# Patient Record
Sex: MALE | Race: OTHER
[De-identification: names, ages, dates, MRNs, and addresses within clinical notes are randomized per-mention and may not be internally consistent; named-entity substitution may affect disease eponyms.]

---

## 2020-02-20 ENCOUNTER — TRANSCRIPTION ENCOUNTER (OUTPATIENT)
Age: 35
End: 2020-02-20

## 2021-12-07 ENCOUNTER — TRANSCRIPTION ENCOUNTER (OUTPATIENT)
Age: 36
End: 2021-12-07

## 2021-12-15 ENCOUNTER — APPOINTMENT (OUTPATIENT)
Age: 36
End: 2021-12-15

## 2022-08-15 ENCOUNTER — APPOINTMENT (OUTPATIENT)
Dept: AFTER HOURS CARE | Facility: EMERGENCY ROOM | Age: 37
End: 2022-08-15

## 2022-08-15 DIAGNOSIS — U07.1 COVID-19: ICD-10-CM

## 2022-08-15 PROCEDURE — 99204 OFFICE O/P NEW MOD 45 MIN: CPT | Mod: 95

## 2022-08-17 PROBLEM — U07.1 COVID-19 VIRUS INFECTION: Status: ACTIVE | Noted: 2022-08-17

## 2022-08-17 PROBLEM — Z00.00 ENCOUNTER FOR PREVENTIVE HEALTH EXAMINATION: Status: ACTIVE | Noted: 2022-08-17

## 2022-08-17 NOTE — HISTORY OF PRESENT ILLNESS
[Home] : at home, [unfilled] , at the time of the visit. [Other Location: e.g. Home (Enter Location, City,State)___] : at [unfilled] [Verbal consent obtained from patient] : the patient, [unfilled] [FreeTextEntry8] : 35 Yo m c/o covid symptoms. Pt reports he tested positive last night. He is covid Vaccinated and boosted. This is also his 2nd time with covid. He is c/o Cold symptoms and lethargy. No pmh. Nkda. No fevers no dyspnea; + 2 episodes of diarrhea. Keeping hydrated. No sick contacts. No recent travel.

## 2022-08-17 NOTE — PHYSICAL EXAM
[de-identified] : General: pt appears stated age and is in nad, speaking in full clear sentences\par HEENT: AT/NC, pink conjunctiva, anicteric sclerae, EOMI, mmm\par Neck: full ROM, trachea midline\par Lungs: no respiratory distress\par Neuro: awake, alert, responsive; oriented to person, place and time; cranial nerves grossly intact, EOMI intact jaw movement, no facial asymmetry, hearing intact\par

## 2022-08-17 NOTE — PLAN
[FreeTextEntry1] : 1)	Will send the Rx for Paxlovid to [facility] in [place]\par 2)	Pt advised to take ibuprofen or acetaminophen as needed for bodyaches or fevers\par 3)	Pt encouraged to hydrate with Gatorade\par 4)	Pt counseled on warning signs of severe symptoms necessitating ER evaluation\par 5)	Followup w/ PMD\par

## 2022-11-24 ENCOUNTER — NON-APPOINTMENT (OUTPATIENT)
Age: 37
End: 2022-11-24

## 2023-07-19 ENCOUNTER — TRANSCRIPTION ENCOUNTER (OUTPATIENT)
Age: 38
End: 2023-07-19

## 2023-12-26 ENCOUNTER — NON-APPOINTMENT (OUTPATIENT)
Age: 38
End: 2023-12-26

## 2025-04-12 ENCOUNTER — NON-APPOINTMENT (OUTPATIENT)
Age: 40
End: 2025-04-12